# Patient Record
Sex: FEMALE | Race: WHITE | Employment: FULL TIME | URBAN - METROPOLITAN AREA
[De-identification: names, ages, dates, MRNs, and addresses within clinical notes are randomized per-mention and may not be internally consistent; named-entity substitution may affect disease eponyms.]

---

## 2017-09-05 LAB
CHLAMYDIA, EXTERNAL: NEGATIVE
HBSAG, EXTERNAL: NEGATIVE
HIV, EXTERNAL: NEGATIVE
N. GONORRHEA, EXTERNAL: NEGATIVE
RUBELLA, EXTERNAL: NORMAL

## 2018-03-12 LAB — GRBS, EXTERNAL: NEGATIVE

## 2018-03-21 ENCOUNTER — ANESTHESIA (OUTPATIENT)
Dept: LABOR AND DELIVERY | Age: 28
End: 2018-03-21

## 2018-03-21 ENCOUNTER — ANESTHESIA EVENT (OUTPATIENT)
Dept: LABOR AND DELIVERY | Age: 28
End: 2018-03-21

## 2018-03-21 ENCOUNTER — HOSPITAL ENCOUNTER (OUTPATIENT)
Age: 28
LOS: 1 days | Discharge: HOME | End: 2018-03-21
Attending: OBSTETRICS & GYNECOLOGY | Admitting: OBSTETRICS & GYNECOLOGY
Payer: COMMERCIAL

## 2018-03-21 VITALS
HEART RATE: 88 BPM | TEMPERATURE: 98.1 F | SYSTOLIC BLOOD PRESSURE: 112 MMHG | RESPIRATION RATE: 18 BRPM | DIASTOLIC BLOOD PRESSURE: 67 MMHG

## 2018-03-21 PROCEDURE — 99284 EMERGENCY DEPT VISIT MOD MDM: CPT

## 2018-03-21 PROCEDURE — 59025 FETAL NON-STRESS TEST: CPT

## 2018-03-21 NOTE — PROGRESS NOTES
1211-Dr Hernandez at bedside for version attempt. Ultrasound in room. 1215- vertex by ultrasound, no version necessary.

## 2018-03-21 NOTE — PROGRESS NOTES
Antepartum progress note    Patient seen, fetal heart rate and contraction pattern evaluated, patient examined. Here for version. VSS    Bedside US:  Vtx, good movement. Back down. Physical Exam:  Cervical Exam:  3/50/-2 Posterior  Membranes:  Intact  Uterine Activity: None  Fetal Heart Rate: Reactive    Assessment/Plan: Pt here for version since pt breech in the office last week. RTUS done showed [place] presentation. No version needed. Has fu appt in [facility] on Friday.    Patient Active Problem List   Diagnosis Code    Postpartum care following vaginal delivery Z39.2       Chalo Morgan MD

## 2018-03-21 NOTE — IP AVS SNAPSHOT
[address] Patient: Maura Alvarez MRN: [id number] DELORES:0[birth year]38 About your hospitalization You were admitted on:  March 21, 2018 You last received care in the:  [address] [place] [address] You were discharged on:  March 21, 2018 Why you were hospitalized Your primary diagnosis was:  Not on File Follow-up Information None Discharge Orders None A check sue indicates which time of day the medication should be taken. My Medications ASK your doctor about these medications Instructions Each Dose to Equal  
 Morning Noon Evening Bedtime  
 ibuprofen 800 mg tablet Commonly known as:  MOTRIN Your last dose was: Your next dose is: Take 1 Tab by mouth every eight (8) hours as needed. 800 mg PRENATAL PO Your last dose was: Your next dose is: Take  by mouth. Discharge Instructions None Introducing [facility] SERVICES! [employer] introduces [email or web address] patient portal. Now you can access parts of your medical record, email your doctor's office, and request medication refills online. 1. In your internet browser, go to [email or web address] 2. Click on the First Time User? Click Here link in the Sign In box. You will see the New Member Sign Up page. 3. Enter your [email or web address] Access Code exactly as it appears below. You will not need to use this code after youve completed the sign-up process. If you do not sign up before the expiration date, you must request a new code. · [email or web address] Access Code: [email or web address] Expires: 6/19/2018 12:29 PM 
 
4. Enter the last four digits of your Social Security Number (xxxx) and Date of Birth (mm/dd/yyyy) as indicated and click Submit. You will be taken to the next sign-up page. 5. Create a Mir[employer]oint Software ID. This will be your Mir[employer]oint [employer] login ID and cannot be changed, so think of one that is secure and easy to remember. 6. Create a [employer] password. You can change your password at any time. 7. Enter your Password Reset Question and Answer. This can be used at a later time if you forget your password. 8. Enter your e-mail address. You will receive e-mail notification when new information is available in [address] 9. Click Sign Up. You can now view and download portions of your medical record. 10. Click the Download Summary menu link to download a portable copy of your medical information. If you have questions, please visit the Frequently Asked Questions section of the Mir[employer]oint [employer] website. Remember, Mir[employer]oint Software is NOT to be used for urgent needs. For medical emergencies, dial 911. Now available from your iPhone and Android! Providers Seen During Your Hospitalization Provider Specialty Primary office phone Cynthia Morales 7 Gynecology [phone] Your Primary Care Physician (PCP) Primary Care Physician Office Phone Office Fax [address], [address] [phone] You are allergic to the following No active allergies Recent Documentation OB Status Smoking Status Pregnant Former Smoker Emergency Contacts Name Discharge Info Relation Home Work Mobile [facility] DISCHARGE CAREGIVER [3] Mother [14] [phone] [facility] CAREGIVER [3] Spouse [3] [phone] Patient Belongings The following personal items are in your possession at time of discharge: 
                             
 
  
  
Discharge Instructions Attachments/References PREGNANCY: KICK COUNTS (ENGLISH) PREGNANCY: PRECAUTIONS (ENGLISH) Patient Handouts Counting Your Baby's Kicks: Care Instructions Your Care Instructions Counting your baby's kicks is one way your doctor can tell that your baby is healthy. Most women-especially in a first pregnancy-feel their baby move for the first time between 16 and 22 weeks. The movement may feel like flutters rather than kicks. Your baby may move more at certain times of the day. When you are active, you may notice less kicking than when you are resting. At your prenatal visits, your doctor will ask whether the baby is active. In your last trimester, your doctor may ask you to count the number of times you feel your baby move. Follow-up care is a key part of your treatment and safety. Be sure to make and go to all appointments, and call your doctor if you are having problems. It's also a good idea to know your test results and keep a list of the medicines you take. How do you count fetal kicks? · A common method of checking your baby's movement is to count the number of kicks or moves you feel in 1 hour. Ten movements (such as kicks, flutters, or rolls) in 1 hour are normal. Some doctors suggest that you count in the morning until you get to 10 movements. Then you can quit for that day and start again the next day. · Pick your baby's most active time of day to count. This may be any time from morning to evening. · If you do not feel 10 movements in an hour, your baby may be sleeping. Wait for the next hour and count again. When should you call for help? Call your doctor now or seek immediate medical care if: 
? · You noticed that your baby has stopped moving or is moving much less than normal. ? Watch closely for changes in your health, and be sure to contact your doctor if you have any problems. Where can you learn more? Go to http://len-huma.info/. Enter P669 in the search box to learn more about \"Counting Your Baby's Kicks: Care Instructions. \" Current as of: March 16, 2017 Content Version: 11.4 © 1344-9317 [employer]. Care instructions adapted under license by [employer] (which disclaims liability or warranty for this information). If you have questions about a medical condition or this instruction, always ask your healthcare professional. [address] any warranty or liability for your use of this information. Pregnancy Precautions: Care Instructions Your Care Instructions There is no sure way to prevent labor before your due date ( labor) or to prevent most other pregnancy problems. But there are things you can do to increase your chances of a healthy pregnancy. Go to your appointments, follow your doctor's advice, and take good care of yourself. Eat well, and exercise (if your doctor agrees). And make sure to drink plenty of water. Follow-up care is a key part of your treatment and safety. Be sure to make and go to all appointments, and call your doctor if you are having problems. It's also a good idea to know your test results and keep a list of the medicines you take. How can you care for yourself at home? · Make sure you go to your prenatal appointments. At each visit, your doctor will check your blood pressure. Your doctor will also check to see if you have protein in your urine. High blood pressure and protein in urine are signs of preeclampsia. This condition can be dangerous for you and your baby. · Drink plenty of fluids, enough so that your urine is light yellow or clear like water. Dehydration can cause contractions. If you have kidney, heart, or liver disease and have to limit fluids, talk with your doctor before you increase the amount of fluids you drink. · Tell your doctor right away if you notice any symptoms of an infection, such as: ¨ Burning when you urinate. ¨ A foul-smelling discharge from your vagina. ¨ Vaginal itching. ¨ Unexplained fever. ¨ Unusual pain or soreness in your uterus or lower belly. · Eat a balanced diet. Include plenty of foods that are high in calcium and iron. ¨ Foods high in calcium include milk, cheese, yogurt, almonds, and broccoli. ¨ Foods high in iron include red meat, shellfish, poultry, eggs, beans, raisins, whole-grain bread, and leafy green vegetables. · Do not smoke. If you need help quitting, talk to your doctor about stop-smoking programs and medicines. These can increase your chances of quitting for good. · Do not drink alcohol or use illegal drugs. · Follow your doctor's directions about activity. Your doctor will let you know how much, if any, exercise you can do. · Ask your doctor if you can have sex. If you are at risk for early labor, your doctor may ask you to not have sex. · Take care to prevent falls. During pregnancy, your joints are loose, and your balance is off. Sports such as bicycling, skiing, or in-line skating can increase your risk of falling. And don't ride horses or motorcycles, dive, water ski, scuba dive, or parachute jump while you are pregnant. · Avoid getting very hot. Do not use saunas or hot tubs. Avoid staying out in the sun in hot weather for long periods. Take acetaminophen (Tylenol) to lower a high fever. · Do not take any over-the-counter or herbal medicines or supplements without talking to your doctor or pharmacist first. 
When should you call for help? Call 911 anytime you think you may need emergency care. For example, call if: 
? · You passed out (lost consciousness). ? · You have severe vaginal bleeding. ? · You have severe pain in your belly or pelvis. ? · You have had fluid gushing or leaking from your vagina and you know or think the umbilical cord is bulging into your vagina. If this happens, immediately get down on your knees so your rear end (buttocks) is higher than your head. This will decrease the pressure on the cord until help arrives. · ?Call your doctor now or seek immediate medical care if: ? · You have signs of preeclampsia, such as: 
¨ Sudden swelling of your face, hands, or feet. ¨ New vision problems (such as dimness or blurring). ¨ A severe headache. ? · You have any vaginal bleeding. ? · You have belly pain or cramping. ? · You have a fever. ? · You have had regular contractions (with or without pain) for an hour. This means that you have 8 or more within 1 hour or 4 or more in 20 minutes after you change your position and drink fluids. ? · You have a sudden release of fluid from your vagina. ? · You have low back pain or pelvic pressure that does not go away. ? · You notice that your baby has stopped moving or is moving much less than normal. ? Watch closely for changes in your health, and be sure to contact your doctor if you have any problems. Where can you learn more? Go to http://len-huma.info/. Enter [id number] in the search box to learn more about \"Pregnancy Precautions: Care Instructions. \" Current as of: March 16, 2017 Content Version: 11.4 © 1076-8754 [employer]. Care instructions adapted under license by [employer] (which disclaims liability or warranty for this information). If you have questions about a medical condition or this instruction, always ask your healthcare professional. [address] any warranty or liability for your use of this information. Please provide this summary of care documentation to your next provider. Signatures-by signing, you are acknowledging that this After Visit Summary has been reviewed with you and you have received a copy. Patient Signature:  ____________________________________________________________ Date:  ____________________________________________________________  
  
Kimberly Danielson Provider Signature:  ____________________________________________________________ Date:  ____________________________________________________________

## 2018-03-21 NOTE — IP AVS SNAPSHOT
[address] Patient: Sander Dawkins MRN: [id number] GXL:0[birth year] A check sue indicates which time of day the medication should be taken. My Medications ASK your doctor about these medications Instructions Each Dose to Equal  
 Morning Noon Evening Bedtime  
 ibuprofen 800 mg tablet Commonly known as:  MOTRIN Your last dose was: Your next dose is: Take 1 Tab by mouth every eight (8) hours as needed. 800 mg PRENATAL PO Your last dose was: Your next dose is: Take  by mouth.

## 2018-04-04 ENCOUNTER — HOSPITAL ENCOUNTER (INPATIENT)
Age: 28
LOS: 2 days | Discharge: HOME | End: 2018-04-06
Attending: OBSTETRICS & GYNECOLOGY | Admitting: OBSTETRICS & GYNECOLOGY
Payer: COMMERCIAL

## 2018-04-04 PROBLEM — O26.893 RH NEGATIVE STATUS DURING PREGNANCY IN THIRD TRIMESTER: Status: ACTIVE | Noted: 2018-04-04

## 2018-04-04 PROBLEM — Z67.91 RH NEGATIVE STATUS DURING PREGNANCY IN THIRD TRIMESTER: Status: ACTIVE | Noted: 2018-04-04

## 2018-04-04 LAB
BASOPHILS # BLD: 0 K/UL (ref 0–0.06)
BASOPHILS NFR BLD: 0 % (ref 0–2)
DIFFERENTIAL METHOD BLD: ABNORMAL
EOSINOPHIL # BLD: 0.1 K/UL (ref 0–0.4)
EOSINOPHIL NFR BLD: 1 % (ref 0–5)
ERYTHROCYTE [DISTWIDTH] IN BLOOD BY AUTOMATED COUNT: 12.3 % (ref 11.6–14.5)
HCT VFR BLD AUTO: 35.8 % (ref 35–45)
HGB BLD-MCNC: 12.6 G/DL (ref 12–16)
LYMPHOCYTES # BLD: 2.2 K/UL (ref 0.9–3.6)
LYMPHOCYTES NFR BLD: 14 % (ref 21–52)
MCH RBC QN AUTO: 30.4 PG (ref 24–34)
MCHC RBC AUTO-ENTMCNC: 35.2 G/DL (ref 31–37)
MCV RBC AUTO: 86.5 FL (ref 74–97)
MONOCYTES # BLD: 1 K/UL (ref 0.05–1.2)
MONOCYTES NFR BLD: 6 % (ref 3–10)
NEUTS SEG # BLD: 13 K/UL (ref 1.8–8)
NEUTS SEG NFR BLD: 79 % (ref 40–73)
PLATELET # BLD AUTO: 200 K/UL (ref 135–420)
PMV BLD AUTO: 10.5 FL (ref 9.2–11.8)
RBC # BLD AUTO: 4.14 M/UL (ref 4.2–5.3)
WBC # BLD AUTO: 16.3 K/UL (ref 4.6–13.2)

## 2018-04-04 PROCEDURE — 74011250636 HC RX REV CODE- 250/636: Performed by: ADVANCED PRACTICE MIDWIFE

## 2018-04-04 PROCEDURE — 0KQM0ZZ REPAIR PERINEUM MUSCLE, OPEN APPROACH: ICD-10-PCS | Performed by: OBSTETRICS & GYNECOLOGY

## 2018-04-04 PROCEDURE — 74011250637 HC RX REV CODE- 250/637: Performed by: ADVANCED PRACTICE MIDWIFE

## 2018-04-04 PROCEDURE — 4A0HXCZ MEASUREMENT OF PRODUCTS OF CONCEPTION, CARDIAC RATE, EXTERNAL APPROACH: ICD-10-PCS | Performed by: OBSTETRICS & GYNECOLOGY

## 2018-04-04 PROCEDURE — 74011000250 HC RX REV CODE- 250: Performed by: ADVANCED PRACTICE MIDWIFE

## 2018-04-04 PROCEDURE — 65270000029 HC RM PRIVATE

## 2018-04-04 PROCEDURE — 86870 RBC ANTIBODY IDENTIFICATION: CPT | Performed by: ADVANCED PRACTICE MIDWIFE

## 2018-04-04 PROCEDURE — 85025 COMPLETE CBC W/AUTO DIFF WBC: CPT | Performed by: ADVANCED PRACTICE MIDWIFE

## 2018-04-04 PROCEDURE — 86900 BLOOD TYPING SEROLOGIC ABO: CPT | Performed by: ADVANCED PRACTICE MIDWIFE

## 2018-04-04 RX ORDER — OXYTOCIN 10 [USP'U]/ML
10 INJECTION, SOLUTION INTRAMUSCULAR; INTRAVENOUS
Status: COMPLETED | OUTPATIENT
Start: 2018-04-04 | End: 2018-04-04

## 2018-04-04 RX ORDER — SALICYLIC ACID
240 POWDER (GRAM) MISCELLANEOUS ONCE
Status: COMPLETED | OUTPATIENT
Start: 2018-04-04 | End: 2018-04-04

## 2018-04-04 RX ORDER — MISOPROSTOL 200 UG/1
800 TABLET ORAL
Status: COMPLETED | OUTPATIENT
Start: 2018-04-04 | End: 2018-04-05

## 2018-04-04 RX ORDER — SODIUM CHLORIDE 0.9 % (FLUSH) 0.9 %
5-10 SYRINGE (ML) INJECTION EVERY 8 HOURS
Status: DISCONTINUED | OUTPATIENT
Start: 2018-04-04 | End: 2018-04-05

## 2018-04-04 RX ORDER — SODIUM CHLORIDE 0.9 % (FLUSH) 0.9 %
5-10 SYRINGE (ML) INJECTION AS NEEDED
Status: DISCONTINUED | OUTPATIENT
Start: 2018-04-04 | End: 2018-04-05

## 2018-04-04 RX ORDER — LIDOCAINE HYDROCHLORIDE 10 MG/ML
20 INJECTION, SOLUTION EPIDURAL; INFILTRATION; INTRACAUDAL; PERINEURAL AS NEEDED
Status: DISCONTINUED | OUTPATIENT
Start: 2018-04-04 | End: 2018-04-05

## 2018-04-04 RX ADMIN — OXYTOCIN 10 UNITS: 10 INJECTION INTRAVENOUS at 23:42

## 2018-04-04 RX ADMIN — LIDOCAINE HYDROCHLORIDE 20 ML: 10 INJECTION, SOLUTION EPIDURAL; INFILTRATION; INTRACAUDAL; PERINEURAL at 23:38

## 2018-04-04 RX ADMIN — CASTOR OIL 240 ML: 1 LIQUID ORAL at 23:28

## 2018-04-04 NOTE — IP AVS SNAPSHOT
[address] Patient: Kristin Ahuja MRN: [id number] LETHA:[id number] About your hospitalization You were admitted on:  April 4, 2018 You last received care in the:  [place] [address] You were discharged on:  April 6, 2018 Why you were hospitalized Your primary diagnosis was:  Postpartum Care Following Vaginal Delivery Your diagnoses also included:  Labor And Delivery Indication For Care Or Intervention, Rh Negative Status During Pregnancy In Third Trimester, Second Degree Perineal Laceration During Delivery, Delivered Follow-up Information Follow up With Details Comments Contact Info Sandra Razo MD   [address] [facility] 83 [id number] [phone] Albert Mchugh MD In 6 weeks normal postpartum check up. [facility] 200 230 [facility] [id number] [phone] Discharge Orders None A check sue indicates which time of day the medication should be taken. My Medications CHANGE how you take these medications Instructions Each Dose to Equal  
 Morning Noon Evening Bedtime * ibuprofen 800 mg tablet Commonly known as:  MOTRIN What changed:  Another medication with the same name was added. Make sure you understand how and when to take each. Your last dose was: Your next dose is: Take 1 Tab by mouth every eight (8) hours as needed. 800 mg  
    
   
   
   
  
 * ibuprofen 600 mg tablet Commonly known as:  MOTRIN What changed: You were already taking a medication with the same name, and this prescription was added. Make sure you understand how and when to take each. Your last dose was: Your next dose is: Take 1 Tab by mouth every six (6) hours as needed.   
 600 mg  
    
   
   
   
  
 * Notice: This list has 2 medication(s) that are the same as other medications prescribed for you. Read the directions carefully, and ask your doctor or other care provider to review them with you. CONTINUE taking these medications Instructions Each Dose to Equal  
 Morning Noon Evening Bedtime PRENATAL PO Your last dose was: Your next dose is: Take  by mouth. Where to Get Your Medications Information on where to get these meds will be given to you by the nurse or doctor. ! Ask your nurse or doctor about these medications  
  ibuprofen 600 mg tablet Discharge Instructions CONGRATULATIONS ON THE BIRTH OF YOUR BABY! The first six weeks after childbirth is a time of physical and emotional adjustment. This handout will help to answer questions and provide guidance during the postpartum period. Every family's adjustment is unique, so please call if you have further concerns. At anytime we can be reached at [phone]. During office hours please ask to speak to a charge nurse. After hours, the answering service will take a message and the Nurse-Midwife on-call will return your call. If your question can wait until office hours: Monday-Friday 8:30-4:00, please do so. For emergencies or urgent concerns do not hesitate to call us after hours. DIET Your body is in need of a well-balanced, high protein diet to recuperate from birth. Please continue to take your prenatal vitamins for 6 weeks or as long as you are breastfeeding. Continue to drink at least 6-8 cups of water or other liquid a day. A breastfeeding mother also needs extra protein, calories and calcium containing foods. It is a good rule to drink fluids with every feeding in order to maintain an adequate milk supply and avoid dehydration.   Your baby will probably not be bothered by things in your diet, but if the baby seems extremely fussy or develops a rash, you may want to discuss possible food intolerances with your baby's care provider. PAIN MEDICATIONS Acetaminophen (Tylenol), ibuprofen (Motrin), or other prescribed pain medication may be taken as directed to relieve discomfort. The above medications pass in very minimal amounts into the breast milk and usually will not cause problems. There are medications that may affect the baby, so please consult your baby's care provider before taking medication. If you are breastfeeding, be sure to mention this to any care provider you see so that medications that are safe may be selected. There is an excellent resource called [employer] that is a resource for medication safety in pregnancy and lactation. You can visit their website at HungerTime/ or call them toll free at [phone] if you have any questions about medication safety. UTERINE INVOLUTION / VAGINAL BLEEDING Involution is the process of the uterus returning to pre-pregnant size. It will take approximately six weeks for this process to occur. To achieve this size your uterus becomes firm to slow bleeding loss from the placental site. The first 7 days after birth, the bleeding is red and heavy. It may change with your activity and position. Some small clots are normal.   After ten days, the bleeding should be pale pink and slowed considerably. The next several weeks may progress to a pink, mucousy discharge. This may continue for 6-8 weeks, depending on your activity. During the first four weeks after delivery we recommend using sanitary pads instead of tampons. Douching should also be avoided, but it is fine to take a tub bath so long as the tub is very clean. ACTIVITY/EXERCISE Adequate rest is essential to recovery. Try to rest or sleep when the baby sleeps.   After two weeks, you may begin going for short walks, doing Kegel exercises and abdominal crunches. Avoid heavy, jarring or aerobic exercises. Remember to start out slowly and build up to your previous fitness level. Use common sense and don't overdo as rest is important and the benefits of increased rest are a quicker recovery. For the first two weeks after a  try to limit trips up or down steps. Do not lift anything heavier than the baby during this time. Lifting the baby or other objects should be done by bending at the knees rather than the waist.  Driving should be avoided during the first two to three weeks until you have the strength to push firmly on the brakes in case of an emergency. You may ride as a passenger, but DO wear a seat belt at all times. After a few weeks, you may resume normal activity at whatever pace is comfortable for you. Exercise may also be resumed gradually. Walking is a good way to start. Finally, try to be reasonable in your expectations. Caring for a new baby after major surgery can be quite trying. Arrange for assistance at home to ensure that you get enough rest.  
 
POSTPARTUM CHECK You may call the office when you return home to set up a postpartum visit. Most patients will be seen at 6 weeks after delivery, but after a  or other circumstances you may be seen in 2 weeks or less. If you are discharged from the hospital with staples that must be removed, you will be asked to come in sooner. At your postpartum visit, a pelvic exam may be performed. If you are having any problems or concerns, please do not hesitate to call. Once again our number is [phone]. MOOD CHANGES Significant hormonal changes occur in the days following delivery, and as a result, many women experience brief episodes of tearfulness or feeling \"blue. \"  These emotional swings may be made worse by lack of sleep and by the adjustments inherent in becoming a mother.   For some women, these fluctuations are minor. For others, they are overwhelming; creating feelings of anxiety, depression, or the inability to cope. If you have difficulty functioning as a result of feeling down, or if the mood changes seem severe, do not improve, or result is thoughts of harming yourself or others CALL RIGHT AWAY. PERINEAL CARE The basic goals of perineal care are to prevent infection, to relieve pain and promote healing. Your stitches will dissolve in four to six weeks, and do not need to be removed. After urinating, please continue to clean with warm water from front to back. Please continue sitz baths as instructed twice a day for a week or as needed. Call the office if you see pus in the suture site, or have unusual or severe swelling or pain that seems to be getting worse. INCISION CARE If you had a , clean and dry the incision gently as you would the rest of your body. Washing over the area with soap and water, and showering are fine. If steri-strips are present they will gradually come off with time. Tub baths are permitted. You may experience numbness and burning in the area surrounding the incision which usually resolves gradually over the next several weeks or months. RETURN OF MENSTRUATION Your first menstrual period may occur as soon as four to six weeks after your delivery if you are not breast-feeding. If breast-feeding it is more difficult to predict when your first period will occur. Even if you are not yet menstruating, you may be ovulating and it may be possible to conceive again. It is common for your first period after childbirth to be very heavy with an increased amount of cramping. BREASTS Breast-feeding Mothers: Colostrum is excreted in the first 24-72 hours. Mature breast milk will appear on the 2nd to 5th day. Engorgement may occur with the mature milk making your breasts feel warm and very full. Frequent feedings will make you more comfortable. Babies do not nurse on regular schedules. Nursing every 1 1/2 to 2 hours is normal and frequent feeding DOES NOT mean you are not making enough milk. To avoid nipple confusion, do not give bottles for the first 4 weeks. Growth spurts are common and may require more frequent feedings. This is the way baby increases your milk supply. During a growth spurt, you may feel you are feeding very frequently and that your breasts are \"empty. \"  Don't worry, your milk is produced by supply and demand so this increased frequency of feeding will increase your milk supply within 48 hours. Sore nipples may occur with frequent feedings and are sometimes also caused by improper latch. Check for a proper latch. Baby should have a wide open mouth. Use different positions at each feeding if possible. Express a small amount of colostrum or breast milk onto the sore area and leave bra flaps unlatched until dry. The lactation consultant at [facility] is available for outpatient consultation without charge. Call [phone] from Monday-Friday 9:00am- 3:00pm to arrange an outpatient appointment with her. Local [facility] and consultants may also be very helpful. If You Are Not Breast-feeding: You will experience swelling, engorgement and some milk production. There are no safe medications available to stop lactation. Some remedies for engorgement include: wearing a tight bra, ice packs and cold green cabbage leaves placed between the breast and your bra. Change these frequently. Tylenol or Motrin should help with the discomfort. SEXUAL ADJUSTMENTS We recommend that you wait at least four weeks before resuming sexual intercourse. A sore perineum, a demanding baby and fatigue will certainly affect your ability to enjoy lovemaking! A vaginal lubricant is recommended to help with any dryness.   It is very important to remember that you will ovulate BEFORE your first period and can conceive. If you do not wish another pregnancy right away, please take precautions to avoid pregnancy. If you would like a prescription method of birth control, please discuss this with us at your 6 week visit. ELIMINATION We remind all postpartum patients that it may take a few days for your bowels to return to normal, especially if you had a long labor. For those who had C-sections or severe lacerations, we recommend that you use a stool softener twice daily for at least two weeks. Many stool softeners are over-the-counter. Colace (Docusate Sodium) is recommended. Bulk forming agents such as Metamucil or Fibercon may be used daily in addition to a stool softener to promote regular bowel movements. Eating fresh fruits and vegetables along with whole grains is helpful as well. Do not be afraid to have a bowel movement as your stitches will not \"come out\" in the course of having a bowel movement. Urination may be difficult due to soreness around the urethra, or as an after effect of epidural.  This is temporary and can be helped  by squirting water over the perineum or try going in the shower. Hemorrhoids are common after birth. Tucks pads, Anusol cream and avoiding constipation are helpful. If constipation does occur, you may take Milk of Magnesia or Senekot according to the package instructions. DANGER SIGNS! CALL WITHOUT DELAY IF YOU ARE EXPERIENCING ANY OF THE FOLLOWING: 
* Unusually heavy bleeding, soaking more than 1 or more pads in an hour. * Vaginal discharge with strong foul odor. * Fever of 101 or higher * Unusual pain or tenderness in the abdominal area. * If breasts are red, hot or have a painful lump. * Depression that persists longer than 1-2 weeks or is severe. * Any urinary frequency accompanied by urgency or pain. * A lump in leg or calf especially if painful, warm or red. We thank you for choosing us for your prenatal care and/or delivery. We wish you all happiness and health with your baby for his or her lifetime! Introducing \[facility] [place] [facility]\"" & HEALTH SERVICES! [employer] introduces Whatâ€™s On Foodie patient portal. Now you can access parts of your medical record, email your doctor's office, and request medication refills online. 1. In your internet browser, go to [email or web address] 2. Click on the First Time User? Click Here link in the Sign In box. You will see the New Member Sign Up page. 3. Enter your Whatâ[employer] Access Code exactly as it appears below. You will not need to use this code after youve completed the sign-up process. If you do not sign up before the expiration date, you must request a new code. · [employer] Access Code: [email or web address] Expires: 6/19/2018 12:29 PM 
 
4. Enter the last four digits of your Social Security Number (xxxx) and Date of Birth (mm/dd/yyyy) as indicated and click Submit. You will be taken to the next sign-up page. 5. Create a [employer]ie ID. This will be your [employer] login ID and cannot be changed, so think of one that is secure and easy to remember. 6. Create a Whatâ[employer]ie password. You can change your password at any time. 7. Enter your Password Reset Question and Answer. This can be used at a later time if you forget your password. 8. Enter your e-mail address. You will receive e-mail notification when new information is available in [address] 9. Click Sign Up. You can now view and download portions of your medical record. 10. Click the Download Summary menu link to download a portable copy of your medical information. If you have questions, please visit the Frequently Asked Questions section of the Whatâ€™[employer]ie website. Remember, [employer] is NOT to be used for urgent needs. For medical emergencies, dial 911. Now available from your iPhone and Android! Introducing Fei Mathis As a [facility]o [facility]edman patient, I wanted to make you aware of our electronic visit tool called Fei Frenchfin. [facility] 24/7 allows you to connect within minutes with a medical provider 24 hours a day, seven days a week via a mobile device or tablet or logging into a secure website from your computer. You can access Fei Frenchfin from anywhere in the [place]. A virtual visit might be right for you when you have a simple condition and feel like you just dont want to get out of bed, or cant get away from work for an appointment, when your regular Pranayirene Elier provider is not available (evenings, weekends or holidays), or when youre out of town and need minor care. Electronic visits cost only $49 and if the Elo Freedman 24/7 provider determines a prescription is needed to treat your condition, one can be electronically transmitted to a nearby pharmacy*. Please take a moment to enroll today if you have not already done so. The enrollment process is free and takes just a few minutes. To enroll, please download the Elo Freedman 24/7 sincere to your tablet or phone, or visit [email or web address] to enroll on your computer. And, as an [address] [place] [address] patient with a EUDOWEB account, the results of your visits will be scanned into your electronic medical record and your primary care provider will be able to view the scanned results. We urge you to continue to see your regular Elo Freedman provider for your ongoing medical care. And while your primary care provider may not be the one available when you seek a Fei Mathis virtual visit, the peace of mind you get from getting a real diagnosis real time can be priceless. For more information on Fei Coyyolandefin, view our Frequently Asked Questions (FAQs) at [email or web address]. Sincerely, 
 
Greg Leong MD 
Chief Medical Officer Tae Ball *:  certain medications cannot be prescribed via Fei Mathis Providers Seen During Your Hospitalization Provider Specialty Primary office phone Cynthia Mir 7 Gynecology [phone] Your Primary Care Physician (PCP) Primary Care Physician Office Phone Office Fax [address] [place], [address] [place] [phone] You are allergic to the following No active allergies Recent Documentation Height Weight Breastfeeding? BMI OB Status Smoking Status 1.753 m 86.2 kg Unknown 28.06 kg/m2 Recent pregnancy Former Smoker Emergency Contacts Name Discharge Info Relation Home Work Mobile [facility] DISCHARGE CAREGIVER [3] Mother [14] [phone] [facility] CAREGIVER [3] Spouse [3] [phone] Patient Belongings The following personal items are in your possession at time of discharge: 
  Dental Appliances: None  Visual Aid: None      Home Medications: None   Jewelry: Ring  Clothing: At bedside    Other Valuables: At bedside Discharge Instructions Attachments/References BREASTFEEDING (ENGLISH) DEPRESSION: POSTPARTUM (ENGLISH) Patient Handouts Breastfeeding: Care Instructions Your Care Instructions Breastfeeding has many benefits. It may lower your baby's chances of getting an infection. It also may prevent your baby from having problems such as diabetes and high cholesterol later in life. Breastfeeding also helps you bond with your baby. The American Academy of Pediatrics recommends breastfeeding for at least a year. That may be very hard for many women to do, but breastfeeding even for a shorter period of time is a health benefit to you and your baby. In the first days after birth, your breasts make a thick, yellow liquid called colostrum. This liquid gives your baby nutrients and antibodies against infection.  It is all that babies need in the first days after birth. Your breasts will fill with milk a few days after the birth. Breastfeeding is a skill that gets better with practice. It is common to have some problems. Some women have sore or cracked nipples, blocked milk ducts, or a breast infection (mastitis). But if you feed your baby every 1 to 2 hours during the day and follow the tips on this sheet, you may not have these problems. You can treat these problems if they happen and continue breastfeeding. Follow-up care is a key part of your treatment and safety. Be sure to make and go to all appointments, and call your doctor if you are having problems. It's also a good idea to know your test results and keep a list of the medicines you take. How can you care for yourself at home? · Breastfeed your baby whenever he or she is hungry. In the first 2 weeks, your baby will feed about every 1 to 3 hours. This will help you keep up your supply of milk. · Put a bed pillow or a nursing pillow on your lap to support your arms and your baby. · Hold your baby in a comfortable position. ¨ You can hold your baby in several ways. One of the most common positions is the cradle hold. One arm supports your baby, with his or her head in the bend of your elbow. Your open hand supports your baby's bottom or back. Your baby's belly lies against yours. ¨ If you had your baby by , or , try the football hold. This position keeps your baby off your belly. Tuck your baby under your arm, with his or her body along the side you will be feeding on. Support your baby's upper body with your arm. With that hand you can control your baby's head to bring his or her mouth to your breast. 
¨ Try different positions with each feeding. If you are having problems, ask for help from your doctor or a lactation consultant.  
· To get your baby to latch on: 
¨ Support and narrow your breast with one hand using a \"U hold,\" with your thumb on the outer side of your breast and your fingers on the inner side. You can also use a \"C hold,\" with all your fingers below the nipple and your thumb above it. Try the different holds to get the deepest latch for whichever breastfeeding position you use. Your other arm is behind your baby's back, with your hand supporting the base of the baby's head. Position your fingers and thumb to point toward your baby's ears. ¨ You can touch your baby's lower lip with your nipple to get your baby to open his or her mouth. Wait until your baby opens up really wide, like a big yawn. Then be sure to bring the baby quickly to your breast-not your breast to the baby. As you bring your baby toward your breast, use your other hand to support the breast and guide it into his or her mouth. ¨ Both the nipple and a large portion of the darker area around the nipple (areola) should be in the baby's mouth. The baby's lips should be flared outward, not folded in (inverted). ¨ Listen for a regular sucking and swallowing pattern while the baby is feeding. If you cannot see or hear a swallowing pattern, watch the baby's ears, which will wiggle slightly when the baby swallows. If the baby's nose appears to be blocked by your breast, tilt the baby's head back slightly, so just the edge of one nostril is clear for breathing. ¨ When your baby is latched, you can usually remove your hand from supporting your breast and bring it under your baby to cradle him or her. Now just relax and breastfeed your baby. · You will know that your baby is feeding well when: 
¨ His or her mouth covers a lot of the areola, and the lips are flared out. ¨ His or her chin and nose rest against your breast. 
¨ Sucking is deep and rhythmic, with short pauses. ¨ You are able to see and hear your baby swallowing. ¨ You do not feel pain in your nipple.  
· If your baby takes only one breast at a feeding, start the next feeding on the other breast. 
 · Anytime you need to remove your baby from the breast, put one finger in the corner of his or her mouth. Push your finger between your baby's gums to gently break the seal. If you do not break the tight seal before you remove your baby, your nipples can become sore, cracked, or bruised. · After feeding your baby, gently pat his or her back to let out any swallowed air. After your baby burps, offer the breast again, or offer the other breast. Sometimes a baby will want to keep feeding after being burped. When should you call for help? Call your doctor now or seek immediate medical care if: 
? · You have symptoms of a breast infection, such as: 
¨ Increased pain, swelling, redness, or warmth around a breast. 
¨ Red streaks extending from the breast. 
¨ Pus draining from a breast. 
¨ A fever. ? · Your baby has no wet diapers for 6 hours. ? Watch closely for changes in your health, and be sure to contact your doctor if: 
? · Your baby has trouble latching on to your breast.  
? · You continue to have pain or discomfort when breastfeeding. ? · You have other questions or concerns. Where can you learn more? Go to http://len-huma.info/. Enter P492 in the search box to learn more about \"Breastfeeding: Care Instructions. \" Current as of: March 16, 2017 Content Version: 11.4 © 4376-1331 [employer]. Care instructions adapted under license by [employer] (which disclaims liability or warranty for this information). If you have questions about a medical condition or this instruction, always ask your healthcare professional. [place] [address] any warranty or liability for your use of this information. Depression After Childbirth: Care Instructions Your Care Instructions Many women get the \"baby blues\" during the first few days after childbirth. You may lose sleep, feel irritable, and cry easily.  You may feel happy one minute and sad the next. Hormone changes are one cause of these emotional changes. Also, the demands of a new baby, along with visits from relatives or other family needs, add to a mother's stress. The \"baby blues\" often peak around the fourth day. Then they ease up in less than 2 weeks. If your moodiness or anxiety lasts for more than 2 weeks, or if you feel like life is not worth living, you may have postpartum depression. This is different for each mother. Some mothers with serious depression may worry intensely about their infant's well-being. Others may feel distant from their child. Some mothers might even feel that they might harm their baby. A mother may have signs of paranoia, wondering if someone is watching her. Depression is not a sign of weakness. It is a medical condition that requires treatment. Medicine and counseling often work well to reduce depression. Talk to your doctor about taking antidepressant medicine while breastfeeding. Follow-up care is a key part of your treatment and safety. Be sure to make and go to all appointments, and call your doctor if you are having problems. It's also a good idea to know your test results and keep a list of the medicines you take. How do you know if you are depressed? With all the changes in your life, you may not know if you are depressed. Pregnancy sometimes causes changes in how you feel that are similar to the symptoms of depression. Symptoms of depression include: · Feeling sad or hopeless and losing interest in daily activities. These are the most common symptoms of depression. · Sleeping too much or not enough. · Feeling tired. You may feel as if you have no energy. · Eating too much or too little. · Writing or talking about death, such as writing suicide notes or talking about guns, knives, or pills.  Keep the numbers for these national suicide hotlines: 7-024-675-TALK (0-634.104.4751) and 1-522-QFZGQOF (2-603.549.3634). If you or someone you know talks about suicide or feeling hopeless, get help right away. How can you care for yourself at home? · Be safe with medicines. Take your medicines exactly as prescribed. Call your doctor if you think you are having a problem with your medicine. · Eat a healthy diet so that you can keep up your energy. · Get regular daily exercise, such as walks, to help improve your mood. · Get as much sunlight as possible. Keep your shades and curtains open. Get outside as much as you can. · Avoid using alcohol or other substances to feel better. · Get as much rest and sleep as possible. Avoid doing too much. Being too tired can increase depression. · Play stimulating music throughout your day and soothing music at night. · Schedule outings and visits with friends and family. Ask them to call you regularly, so that you do not feel alone. · Ask for help with preparing food and other daily tasks. Family and friends are often happy to help a mother with a . · Be honest with yourself and those who care about you. Tell them about your struggle. · Join a support group of new mothers. No one can better understand the challenges of caring for a  than other new mothers. · If you feel like life is not worth living or are feeling hopeless, get help right away. Keep the numbers for these national suicide hotlines: 5-288-375-TALK (2-020-712-903.281.3708) and 0-801-YSPLYWT (2-658.404.4129). When should you call for help? Call 911 anytime you think you may need emergency care. For example, call if: 
? · You feel you cannot stop from hurting yourself, your baby, or someone else. ?Call your doctor now or seek immediate medical care if: 
? · You are having trouble caring for yourself or your baby. ? · You hear voices. ? Watch closely for changes in your health, and be sure to contact your doctor if: 
? · You have problems with your depression medicine. ? · You do not get better as expected. Where can you learn more? Go to http://len-huma.info/. Enter D077 in the search box to learn more about \"Depression After Childbirth: Care Instructions. \" Current as of: May 12, 2017 Content Version: 11.4 © 0779-6837 [employer]. Care instructions adapted under license by [employer] (which disclaims liability or warranty for this information). If you have questions about a medical condition or this instruction, always ask your healthcare professional. [address] any warranty or liability for your use of this information. Please provide this summary of care documentation to your next provider. Signatures-by signing, you are acknowledging that this After Visit Summary has been reviewed with you and you have received a copy. Patient Signature:  ____________________________________________________________ Date:  ____________________________________________________________  
  
[facility] Provider Signature:  ____________________________________________________________ Date:  ____________________________________________________________

## 2018-04-05 LAB
ABO + RH BLD: NORMAL
BLOOD BANK CMNT PATIENT-IMP: NORMAL
BLOOD GROUP ANTIBODIES SERPL: NORMAL
BLOOD GROUP ANTIBODIES SERPL: NORMAL
SPECIMEN EXP DATE BLD: NORMAL

## 2018-04-05 PROCEDURE — 75410000014 HC MIDWIFREY DEL SVC

## 2018-04-05 PROCEDURE — 75410000002 HC LABOR FEE PER 1 HR

## 2018-04-05 PROCEDURE — 74011250637 HC RX REV CODE- 250/637: Performed by: ADVANCED PRACTICE MIDWIFE

## 2018-04-05 PROCEDURE — 75410000000 HC DELIVERY VAGINAL/SINGLE

## 2018-04-05 PROCEDURE — 75410000003 HC RECOV DEL/VAG/CSECN EA 0.5 HR

## 2018-04-05 PROCEDURE — 65270000029 HC RM PRIVATE

## 2018-04-05 RX ORDER — IBUPROFEN 600 MG/1
600 TABLET ORAL
Status: DISCONTINUED | OUTPATIENT
Start: 2018-04-05 | End: 2018-04-06 | Stop reason: HOSPADM

## 2018-04-05 RX ORDER — MISOPROSTOL 200 UG/1
800 TABLET ORAL
Status: DISCONTINUED | OUTPATIENT
Start: 2018-04-05 | End: 2018-04-06 | Stop reason: HOSPADM

## 2018-04-05 RX ORDER — OXYTOCIN 10 [USP'U]/ML
10 INJECTION, SOLUTION INTRAMUSCULAR; INTRAVENOUS
Status: DISCONTINUED | OUTPATIENT
Start: 2018-04-05 | End: 2018-04-06 | Stop reason: HOSPADM

## 2018-04-05 RX ORDER — HYDROCORTISONE 10 MG/G
CREAM TOPICAL AS NEEDED
Status: DISCONTINUED | OUTPATIENT
Start: 2018-04-05 | End: 2018-04-06 | Stop reason: HOSPADM

## 2018-04-05 RX ORDER — ACETAMINOPHEN 325 MG/1
325-650 TABLET ORAL
Status: DISCONTINUED | OUTPATIENT
Start: 2018-04-05 | End: 2018-04-06 | Stop reason: HOSPADM

## 2018-04-05 RX ORDER — SENNOSIDES 8.6 MG/1
2 TABLET ORAL
Status: DISCONTINUED | OUTPATIENT
Start: 2018-04-05 | End: 2018-04-06 | Stop reason: HOSPADM

## 2018-04-05 RX ADMIN — MISOPROSTOL 800 MCG: 200 TABLET ORAL at 00:11

## 2018-04-05 RX ADMIN — IBUPROFEN 600 MG: 600 TABLET, FILM COATED ORAL at 18:16

## 2018-04-05 RX ADMIN — IBUPROFEN 600 MG: 600 TABLET, FILM COATED ORAL at 03:25

## 2018-04-05 RX ADMIN — IBUPROFEN 600 MG: 600 TABLET, FILM COATED ORAL at 12:54

## 2018-04-05 RX ADMIN — ACETAMINOPHEN 650 MG: 325 TABLET, FILM COATED ORAL at 03:25

## 2018-04-05 RX ADMIN — ACETAMINOPHEN 650 MG: 325 TABLET, FILM COATED ORAL at 19:36

## 2018-04-05 RX ADMIN — ACETAMINOPHEN 650 MG: 325 TABLET, FILM COATED ORAL at 08:16

## 2018-04-05 NOTE — PROGRESS NOTES
Problem: Falls - Risk of  Goal: *Absence of Falls  Document Maritza Fall Risk and appropriate interventions in the flowsheet. Outcome: Progressing Towards Goal  Fall Risk Interventions:        Call light in reach. Hourly Rounding.

## 2018-04-05 NOTE — PROGRESS NOTES
TRANSFER - IN REPORT:    Verbal report received from KRZYSZTOF Bonds RN (name) on [employer]  being received from L&D (unit) for routine progression of care      Report consisted of patients Situation, Background, Assessment and   Recommendations(SBAR). Information from the following report(s) SBAR, Kardex, Intake/Output, MAR and Recent Results was reviewed with the receiving nurse. Opportunity for questions and clarification was provided. Assessment completed upon patients arrival to unit and care assumed.

## 2018-04-05 NOTE — PROGRESS NOTES
Post-Partum Day Number 1 Progress Note    Fran Lay is doing well. Complaining of symphysis pubic pain. Pain controlled with medications. Nursing well. Voiding well. Vitals:  Patient Vitals for the past 8 hrs:   BP Temp Pulse Resp SpO2   18 0325 102/67 97.9 °F (36.6 °C) 88 14 99 %   18 0130 111/73 98 °F (36.7 °C) 91 16 -   18 0100 123/75 - - - -   18 0045 126/81 - - - -   18 0030 127/79 - - - -   18 0015 124/70 - - - -   18 0008 126/79 - - - -     Temp (24hrs), Av °F (36.7 °C), Min:97.9 °F (36.6 °C), Max:98 °F (36.7 °C)      Vital signs stable, afebrile. Exam:  Patient without distress. Breasts intact and nontender               Abdomen soft, fundus firm at level of umbilicus, nontender               Perineum with normal lochia noted. Lower extremities are negative for swelling, cords or tenderness. Lab/Data Review: All lab results for the last 24 hours reviewed. Assessment and Plan:  Holly complaining of symphysis pubis pain. Questioning when she can start to see PT. Will put in a PT consult to evaluate prior to leaving the hospital. Otherwise, Robi Cochran appears to be having uncomplicated post-partum course. Continue routine perineal care and maternal education. Plan discharge tomorrow if no problems occur.     Malachi Alvarenga CNM  2018  7:02 AM

## 2018-04-05 NOTE — H&P
History & Physical    Name: Yenny Galdamez MRN: [id number]  SSN: [id number]    YOB: 1990  Age: 28 y.o. Sex: female        Subjective:     Yenny Galdamez presents with c/o contractions for two-three hours, now more consistently occurring every 3 mins. Was 4-5cm in the office earlier today. Denies LOF, or VB. Feeling good fetal movements. Estimated Date of Delivery: None noted. OB History      Para Term  AB Living    2 1 1   1    SAB TAB Ectopic Molar Multiple Live Births        0 1        OB History:  G1: 2016 Female at 40.1 Epidural     Ms. Llamas is admitted with pregnancy at 40.0weeks for active labor. Prenatal course was complicated by RH neg, received Rhogam prenatally at 35 weeks. . Prenatal care has been [facility] by [facility]. Please see prenatal records for details. 1 hr gtt was 91. Total weight gain for pregnancy was 30 pounds. Admitted to 3422/01. Past Medical History:   Diagnosis Date    History of migraine headaches     Psychiatric problem      Past Surgical History:   Procedure Laterality Date    HX BUNIONECTOMY  2009    bilateral    HX BUNIONECTOMY Bilateral  &     HX TONSILLECTOMY       Social History     Occupational History    Not on file. Social History Main Topics    Smoking status: Former Smoker    Smokeless tobacco: Never Used    Alcohol use Yes      Comment: rarely    Drug use: Yes     Special: Marijuana    Sexual activity: Yes     Partners: Male     No family history on file. No Known Allergies  Prior to Admission medications    Medication Sig Start Date End Date Taking? Authorizing Provider   ibuprofen (MOTRIN) 800 mg tablet Take 1 Tab by mouth every eight (8) hours as needed. 16   Beba Callaway CNM   PNV95/FERROUS FUMARATE/FA (PRENATAL PO) Take  by mouth.     Historical Provider        Review of Systems: A comprehensive review of systems was negative except for that written in the HPI.    Objective:     Vitals: There were no vitals filed for this visit. Physical Exam:  Patient without distress. Abdomen: soft, nontender, gravid  Fundus: soft, non tender and appears appropriate size for gestational age  Perineum: blood absent, amniotic fluid absent  Lower Extremities:  - Edema No  Membranes:  Intact  Fetal Heart Rate: Baseline: 145 per minute  Variability: moderate  Accelerations: yes  Decelerations: none    Contractions every 3 minutes lasting 60 secs. VE 7cm/100/-1   IBOW    Prenatal Labs:   A neg, Immune, Hep B and C, GC/CT, RPR, and HIV negative. Group B Strep was negative. Assessment/Plan:   Assessment: IUP @ 40.0, FHT CAt 1. Appropriate for [facility] care. Plan: Admit for Reassuring fetal status, Labor  Continue expectant management, Continue plan for vaginal delivery. Signed By:  Leila Jackson CNM     April 4, 2018     Addendum: Dr Robert Lowe updated on patients arrival and status.

## 2018-04-05 NOTE — PROGRESS NOTES
Visited mother. Acquired permission to announce baby.     Kan Tang   [facility]   [phone]

## 2018-04-05 NOTE — ROUTINE PROCESS
Bedside and Verbal shift change report given to [address] [place] [address] (oncoming nurse) by Marbella Jenkins RN (offgoing nurse). Report included the following information SBAR, Procedure Summary, Intake/Output, MAR and Recent Results.

## 2018-04-05 NOTE — ROUTINE PROCESS
Bedside and Verbal shift change report given to JERRY Gill rn (oncoming nurse) by SHUBHAM Moser rn (offgoing nurse). Report included the following information SBAR, Kardex, Intake/Output and Recent Results. 1620- Assisted with breast feeding. Encouraged mother to keep trying. Baby got 6 good sucks in and good latch noted. Encouraged to put on call light for further assistance and encouraged skin to skin. 46- Mother doing well, up without complaints, voiding without problems. Pain managed well with motrin. Bonding well with baby.

## 2018-04-05 NOTE — PROGRESS NOTES
~~ 0800 ASSUME CARE OF PT SITTING IN BED, SR UP X2, CB IN REACH, BABY SLEEPING IN CRIB, FOB IN. PT DENIES INCREASED BLEEDING, DIFF VOIDING, NAUSEA OR ITCHING. PT REQ PAIN MED-- WILL GET. WHITE BOARD FILLED OUT, QUIET TIME REVIEWED. PT OK TO SHOWER-- REVIEWED 1st SHOWER NOT TOO LONG OR HOT. REVIEWED EMERGENCY CORD IN BR & WHEN TO USE- PT VOICES UNDERSTANDING.     ~~0816 TYLENOL GIVEN FOR PAIN OF 7/10 FOR CRAMPING, HIP PAIN & SYMPHYSIS PAIN-- REVIEWED PAN SCALE & PT CONFIRMS IT'S NEARLY AS BAD AS LABOR (UNMEDICATED [facility]) WATER MUG REFILLED, REVIEWED WATER CONSUMPTION    ~~ 0900 ASSESSMENT AS CHARTED.     ~~0910 PAIN MED HELPED     ~~ 1015  PT UP IN THE SHOWER- BED PAD CHANGED, BED LINEN FLUFFED. EXTRA BABY BLANKETS PROVIDED & CRIB LINEN CHANGED AFTER SPIT UP-  WATER MUG REFILLED--  MOM OUT OF THE SHOWER SITTING IN CHAIR-- SAYS SHE IS MOVING AROUND BETTER & FEELING BATTER    ~~ 1115 PT RESTING, BONDING W/ BABY    ~~1200 REG DIET LUNCH TRAY SERVED. GRAND PARENTS & TODDLER SIBLING HERE VISITING    ~~1254 MOTRIN GIVEN FOR PAIN OF 7/10 AGAIN--      ~~1330 QUIET TIME- MOTRIN HAS NOT YET TAKEN MUCH EFFECTIVE-- DOWN TO 6-- PT TO CALL DURING QUIET TIME IF ACCEPTABLE LEVEL NOT ACHIEVED.

## 2018-04-05 NOTE — L&D DELIVERY NOTE
Delivery Summary    Patient: Stacy Chapman MRN: [id number]  SSN: [id number]    YOB: 1990  Age: 28 y.o. Sex: female          Baby girl Long Herrera labored very well, having a full conversation in between contractions after initial strip was obtained. Circuit between swaying and squats. Suggested captain Reford Kugel position, which with one contractions there was SROM of clear fluids. FHT hard to obtained, standing. Holly followed instructions well, laid down and FHT noted to be low. Cervical exam revealed c/c/+2. Peds was called to imminent delivery. FHT improved by the time Pediatrician in the the room. With a few guided pushed there was  of a live female infant using NCB methods. Infant delivered OA position, no nuchal although short cord, and clear fluid. Body delivered without difficulty. Lillie to mothers chest, after cord pulse cessation, cord was doubly clamped and cut by Holly. Placenta delivered spontaneously, intact in Sanchez delbert position. Fundus noted to be boggy with moderate bleeding, although firmed up with fundal massage and then minimal bleeding. Placenta inspected and intact (noted to be bi-lobed) with 3 vessel cord central insertion. Perineum and vagina inspected and there was found to be 2nd degree perineal laceration with an skin extension repaired under local anesthesia with 3-0 vicryl suture and resulting in good hemostasis. Some more moderate vaginal bleeding noted while doing repair, IM pitocin given and later at the end of repair cytotec AR. Bleeding then minimal with fundal massage. Infant with Apgars 8/9. EBL 400cc. Pt tolerated procedure well, recovering in LDR. Dr. Sumeet Alejo updated of delivery. Labor Events:    Labor: No    Rupture Date:      Rupture Time:      Rupture Type Intact    Amniotic Fluid Volume:      Amniotic Fluid Description:         Induction: None        Augmentation: None    Labor Complications:        Additional Complications: Cervical Ripening:              Delivery Events:  Episiotomy: None    Laceration(s): Second degree perineal;Extension      Repaired:       Number of Repair Packets:      Suture Type and Size:         Estimated Blood Loss (ml): 400        Information for the patient's :  Griseldasarah Issa [[id number]]     Delivery Summary - Baby    Delivery Date: 2018   Delivery Time: 11:29 PM   Delivery Type:    Sex:  female  Gestational Age: 45.0  Delivery Clinician:  Freida Guerin  Living?: Living   Delivery Location: L&D             APGARS  One minute Five minutes Ten minutes   Skin Color: 0    1       Heart Rate: 2   2         Reflex Irritability: 2   2         Muscle Tone: 2   2       Respiration: 2   2         Total: 8   9           Presentation: Vertex  Position:   Occiput Anterior  Resuscitation Method:  Suctioning-bulb; Tactile Stimulation     Meconium Stained: None    Cord Information: 3 Vessels   Complications: None  Cord Blood Sent?:  Yes    Blood Gases Sent?:  No    Placenta:  Date/Time:  11:39 PM  Removal: Spontaneous      Appearance: Normal;Other (comment)     Liberty Measurements:  Birth Weight: 3695 gm (8#2oz)     Other Providers:   MIGUEL MARTINEZ;LIDA PAYNE;NILSON GARCIA;JANNETTE ORR;;;;;; Midlevel Student;Primary Nurse;Primary Liberty Nurse;Pediatrician;Neonatologist;Anesthesiologist;Crna;Nurse Practitioner;Midwife;Nursery Nurse       Cord Blood Results:  Information for the patient's :  Griselda Danmamie [[id number]]   No results found for: ABORH, PCTABR, PCTDIG, BILI, ABORHEXT, ABORH    Information for the patient's :  Griselda Issa [[id number]]   No results found for: APH, APCO2, APO2, AHCO3, ABEC, ABDC, O2ST, SITE, RSCOM, PHI, PCO2I, PO2I, HCO3I, SO2I, IBD     Information for the patient's newbornRoe Sims [[id number]]   No results found for: EPHV, PCO2V, PO2V, HCO3V, O2STV, EBDV

## 2018-04-05 NOTE — LACTATION NOTE
Mom states baby is latching and nursing well. Experienced mom, no questions. Offered help. Info sheet, daily log and resource list given. Encouraged to call with questions.

## 2018-04-05 NOTE — PROGRESS NOTES
2235 - Patient arrived to unit ambulating accompanied by spouse, tech and PETE Bingham. Patient is a  with complaints of contractions every 3-4 mins for the past hour. VS stable,  FHR monitor applied. Patient denies bleeding, leaking of fluid or decreased fetal movement. [address] Bingham at bedside to assess patient. Patient in standing position with birthing ball for pain control. 2315 - Patient standing swaying with contractions CNM at bedside. Doppler 's.    2320 - CNM out to desk. SROM clear moderate amount of amniotic fluid. Patient standing at bedside. Unable to assess FHR with doppler. External monitor utilized. 2323 - SVE 10/100/+1  's. Patient prepared to start pushing nursery and peds called. 232 -  of viable female infant. Nursery nurse and pediatrician at bedside. Infant placed on towel on patient's abdomen. Towel dried and stimulated, cry heard shortly thereafter. Cord clamped and cut. Infant placed skin to skin with mom. 2339 - Spontaneous delivery of placenta. Placenta intact per PETE Bingham CNM. Patient sustained 2nd degree perineal laceration with an extension. 0000 - Readjusted patient for better visualization of repair. Repaired with 1 3-0 vicryl with local anesthesia. Patient tolerated well. 0015 -  Pericare complete. New bed and peripad placed under patient. Ice pack placed directly on the perineum. Fundus firm at umbilicus. Vital signs documented per protocol. Patient complains of perineal pain. Ice pack applied with lidocaine. 7800 - Vital signs stable. Fundus firm at umbilicus small lochia. Patient denies pain. Patient up to ambulate to bathroom. Patient voided X1. Room stripped and turned over for postpartum care. Linens changed, trash removed. 0203 - Patient transferred to room 3409. Assisted to bed. Side rails X2. Bedside table and call bell within reach.   Reminded to call for assistance prior to void. 0300 - TRANSFER - OUT REPORT:    Verbal report given to ESTHER Noonan RN(name) on [employer]  being transferred to [facility] room 3409(unit) for routine progression of care       Report consisted of patients Situation, Background, Assessment and   Recommendations(SBAR). Information from the following report(s) SBAR, Procedure Summary, Intake/Output, MAR and Recent Results was reviewed with the receiving nurse. Lines:       Opportunity for questions and clarification was provided.

## 2018-04-05 NOTE — PROGRESS NOTES
Mother doing well. Up without complaints. Voiding without problems. Pain managed well with tylenol and ibuprofen. Bonding well with baby.

## 2018-04-06 VITALS
TEMPERATURE: 98 F | RESPIRATION RATE: 16 BRPM | OXYGEN SATURATION: 99 % | BODY MASS INDEX: 28.14 KG/M2 | DIASTOLIC BLOOD PRESSURE: 75 MMHG | HEIGHT: 69 IN | SYSTOLIC BLOOD PRESSURE: 119 MMHG | WEIGHT: 190 LBS | HEART RATE: 95 BPM

## 2018-04-06 LAB
HCT VFR BLD AUTO: 34.2 % (ref 35–45)
HGB BLD-MCNC: 11.7 G/DL (ref 12–16)

## 2018-04-06 PROCEDURE — 74011250637 HC RX REV CODE- 250/637: Performed by: ADVANCED PRACTICE MIDWIFE

## 2018-04-06 PROCEDURE — 85018 HEMOGLOBIN: CPT | Performed by: OBSTETRICS & GYNECOLOGY

## 2018-04-06 PROCEDURE — 36415 COLL VENOUS BLD VENIPUNCTURE: CPT | Performed by: OBSTETRICS & GYNECOLOGY

## 2018-04-06 PROCEDURE — 97161 PT EVAL LOW COMPLEX 20 MIN: CPT

## 2018-04-06 RX ORDER — IBUPROFEN 600 MG/1
600 TABLET ORAL
Qty: 60 TAB | Refills: 2 | Status: SHIPPED | OUTPATIENT
Start: 2018-04-06

## 2018-04-06 RX ADMIN — ACETAMINOPHEN 650 MG: 325 TABLET, FILM COATED ORAL at 01:31

## 2018-04-06 RX ADMIN — ACETAMINOPHEN 650 MG: 325 TABLET, FILM COATED ORAL at 09:23

## 2018-04-06 RX ADMIN — IBUPROFEN 600 MG: 600 TABLET, FILM COATED ORAL at 01:31

## 2018-04-06 RX ADMIN — IBUPROFEN 600 MG: 600 TABLET, FILM COATED ORAL at 09:23

## 2018-04-06 NOTE — DISCHARGE SUMMARY
Obstetrical Discharge Summary       [address]   [address]  [address]  [phone]        Patient Mally Gutierrez [id number],89 Y.V.,    Postpartum Day: Information for the patient's :  Rich Payne [[id number]]   3 days       Admit Date: 2018    Discharge Date: 2018     Admitting Physician: Harry Short MD     Admission Diagnoses: assessment  Labor and delivery indication for care or intervention    Discharge Diagnoses: same as above      Additional Diagnoses:Present on Admission:   (Resolved) Labor and delivery indication for care or intervention   Rh negative status during pregnancy in third trimester   Postpartum care following vaginal delivery       Patient Active Problem List   Diagnosis Code    Postpartum care following vaginal delivery Z39.2    Rh negative status during pregnancy in third trimester O09.893, Z67.91    Second degree perineal laceration during delivery, delivered O70.1         Procedure: Procedure(s):  external cephalic version - anesthesia on stand-by       Baby link  Information for the [facility]'s [facility] Ros[facility] [[id number]]     Delivery Type:     Delivery Date: 2018   Delivery Time: 11:29 PM     Birth Weight: 3.695 kg     Sex:  female  Delivery Clinician:  Alma Delia Plaza   Gestational Age: Gestational Age: 37w0d    Presentation: Vertex   Position:    Occiput  Anterior     Apgars were 8  and 9      Resuscitation Method: Suctioning-bulb; Tactile Stimulation     Meconium Stained: None  Living Status: Living       Placenta Date/Time:  11:39 PM   Placenta Removal: Spontaneous   Placenta Appearance: Vertex [1]     Cord Information: 3 Vessels    Cord Events: None       Cord Blood Sent?:  Yes    Blood Gases Sent?:  No         Baby procedures:    Information for the patient's :  Rich Payne [[id number]]          Feeding Method: Infant Feeding: Breastmilk    Immunizations: Immunization History   Administered Date(s) Administered    DTaP 11/10/2016    Influenza Vaccine 10/18/2016    Rho(D) Immune Globulin - IM 2016        Immunizations:    Immunization History   Administered Date(s) Administered    DTaP 11/10/2016    Influenza Vaccine 10/18/2016    Rho(D) Immune Globulin - IM 2016       Group Beta Strep: GrBStrep, External   Date Value Ref Range Status   2018 Negative   Final          WBC   Date/Time Value Ref Range Status   2018 10:50 PM 16.3 (H) 4.6 - 13.2 K/uL Final   12/15/2016 08:48 PM 13.6 (H) 4.6 - 13.2 K/uL Final     HGB   Date/Time Value Ref Range Status   2018 06:12 AM 11.7 (L) 12.0 - 16.0 g/dL Final   2018 10:50 PM 12.6 12.0 - 16.0 g/dL Final   2016 05:20 AM 10.9 (L) 12.0 - 16.0 g/dL Final     PLATELET   Date/Time Value Ref Range Status   2018 10:50  135 - 420 K/uL Final         Hospital Course: Unremarkable  Subjective:         Robi Cochran is doing and feeling well. Symphysis pain much better and more tolerable. PT to come today to evaluate. Would like to go home today. Yoko 86 well. Mood has been great. Objective:   Breasts Nontender and intact  Fundus firm and involuting  Lochia rubra, minimal  Legs minimal to no edema and without pain.      Lab/Data Review:  Patient Vitals for the past 8 hrs:   BP Temp Pulse Resp SpO2   18 0430 114/80 97.5 °F (36.4 °C) 87 14 98 %       Temp (24hrs), Av.8 °F (36.6 °C), Min:97.5 °F (36.4 °C), Max:98.3 °F (36.8 °C)      WBC   Date/Time Value Ref Range Status   2018 10:50 PM 16.3 (H) 4.6 - 13.2 K/uL Final   12/15/2016 08:48 PM 13.6 (H) 4.6 - 13.2 K/uL Final     HGB   Date/Time Value Ref Range Status   2018 06:12 AM 11.7 (L) 12.0 - 16.0 g/dL Final   2018 10:50 PM 12.6 12.0 - 16.0 g/dL Final   2016 05:20 AM 10.9 (L) 12.0 - 16.0 g/dL Final     PLATELET   Date/Time Value Ref Range Status   2018 10:50  135 - 420 K/uL Final Patient Instructions:   Current Discharge Medication List      START taking these medications    Details   !! ibuprofen (MOTRIN) 600 mg tablet Take 1 Tab by mouth every six (6) hours as needed. Qty: 60 Tab, Refills: 2       !! - Potential duplicate medications found. Please discuss with provider. CONTINUE these medications which have NOT CHANGED    Details   PNV95/FERROUS FUMARATE/FA (PRENATAL PO) Take  by mouth. !! ibuprofen (MOTRIN) 800 mg tablet Take 1 Tab by mouth every eight (8) hours as needed. Qty: 60 Tab, Refills: 0       !! - Potential duplicate medications found. Please discuss with provider. Assessment:     Status post: postpartum vaginal delivery   SPD- pain improving   Recovering well  Breastfeeding  Mood Stable      Plan:     Postpartum care discussed including diet, ambulation,actvitiy restrictions, and mood fluctuations. Discharge instructions and questions answered for vaginal delivery.   Follow in 6 wks or prn      Signed By: Nely Mittal CNM     April 6, 2018

## 2018-04-06 NOTE — PROGRESS NOTES
Problem: Mobility Impaired (Adult and Pediatric)  Goal: *Acute Goals and Plan of Care (Insert Text)  Outcome: Resolved/Met Date Met: 04/06/18  physical Therapy EVALUATION and Discharge    Patient: Mary Lugo (20 y.o. female)  Date: 4/6/2018  Primary Diagnosis: assessment  Labor and delivery indication for care or intervention  Procedure(s) (LRB):  external cephalic version - anesthesia on stand-by (N/A)     Precautions:   Fall    ASSESSMENT AND RECOMMENDATIONS:  Based on the objective data described below, the patient presents with good mobility and function with safe bed mobility, transfers and ambulation. Patient educated on the importance of ambulation and maintaining current function, using ice for pain control limit carrying of baby and older child as much as possible, start exercises slowly and allow for healing return to outpatient PT when ok by doctor. verbalized understanding . As a result skilled physical therapy is not indicated at this time.   Discharge Recommendations: None   Further Equipment Recommendations for Discharge: N/A      SUBJECTIVE:   Patient stated .    OBJECTIVE DATA SUMMARY:     Past Medical History:   Diagnosis Date    History of migraine headaches     Psychiatric problem 2013     Past Surgical History:   Procedure Laterality Date    HX BUNIONECTOMY  2009    bilateral    HX BUNIONECTOMY Bilateral 2010 & 2011    HX TONSILLECTOMY       Barriers to Learning/Limitations: None  Compensate with: visual, verbal, tactile, kinesthetic cues/model  GCODES(GP):n/a  Eval Complexity: History: HIGH Complexity :3+ comorbidities / personal factors will impact the outcome/ POC Exam:MEDIUM Complexity : 3 Standardized tests and measures addressing body structure, function, activity limitation and / or participation in recreation  Presentation: LOW Complexity : Stable, uncomplicated  Clinical Decision Making:Low Complexity clinical observation mmt, rom balance and gait Overall Complexity:LOW   Prior Level of Function/Home Situation: independent   Home Situation  Home Environment: Private residence  # Steps to Enter: 3  One/Two Story Residence: Two story, live on 1st floor  Living Alone: No  Support Systems: Spouse/Significant Other/Partner  Patient Expects to be Discharged to[de-identified] Private residence  Current DME Used/Available at Home: None  Tub or Shower Type: Tub/Shower combination  Critical Behavior:  Neurologic State: Alert  Orientation Level: Oriented X4  Cognition: Follows commands; Appropriate decision making; Appropriate for age attention/concentration; Appropriate safety awareness  Safety/Judgement: Awareness of environment; Fall prevention  Psychosocial  Patient Behaviors: Calm; Cooperative  Family  Behaviors: Calm; Cooperative  Needs Expressed: Educational  Purposeful Interaction: Yes  Pt Identified Daily Priority: Clinical issues (comment) (pain mangement. )  Caritas Process: Nurture loving kindness; Teaching/learning  Caring Interventions: Reassure  Reassure: Therapeutic listening; Informing  Strength:    Strength: Generally decreased, functional (left<right in legs wfl in both UE's )  Tone & Sensation:   Tone: Normal  Sensation: Intact ( patietn reports was decreased yesterday )  Range Of Motion:  AROM: Within functional limits  PROM: Within functional limits  Functional Mobility:  Bed Mobility:  Rolling: Modified independent  Supine to Sit: Modified independent  Transfers:  Sit to Stand: Modified independent  Stand to Sit: Modified independent  Balance:   Sitting: Intact  Standing: Impaired  Standing - Static: Good;Fair  Standing - Dynamic : Fair  Ambulation/Gait Training:  Gait Description (WDL): Within defined limits  Therapeutic Exercises: Ankle; pumps glut sets isometric hip add  Pain:  Pain Scale 1: Numeric (0 - 10)  Activity Tolerance:   good  Please refer to the flowsheet for vital signs taken during this treatment.   After treatment:   [x]         Patient left in no apparent distress sitting up in chair  []         Patient left in no apparent distress in bed  [x]         Call bell left within reach  [x]         Nursing notified  []         Caregiver present  []         Bed alarm activated    COMMUNICATION/EDUCATION: verbalized and demonstrated understanding. [x]         Fall prevention education was provided and the patient/caregiver indicated understanding. [x]         Patient/family have participated as able in goal setting and plan of care. [x]         Patient/family agree to work toward stated goals and plan of care. []         Patient understands intent and goals of therapy, but is neutral about his/her participation. []         Patient is unable to participate in goal setting and plan of care.     Thank you for this referral.  Traci Estes, PT   Time Calculation: 15 mins

## 2018-04-06 NOTE — ROUTINE PROCESS
Bedside shift change report given to ESTHER Noonan RN  (oncoming nurse) by [facility] Shabnam RN  (offgoing nurse). Report included the following information SBAR, Kardex, Intake/Output, MAR and Recent Results.

## 2018-04-06 NOTE — ROUTINE PROCESS
Bedside and Verbal shift change report given to JERRY Gill rn (oncoming nurse) by [facility] rn (offgoing nurse). Report included the following information SBAR, Kardex, Intake/Output and Recent Results. 1140- Discharge instructions reviewed with patient. Understanding verbalized of all instructions given. Time given for questions, all questions answered. Electronic signature obtained. Patient was seen by PT before discharge. 1159- taken to car in wheel chair in stable condition holding baby in car seat.

## 2018-04-06 NOTE — PROGRESS NOTES
Mother doing well. Up without complaints. Voiding without problems. Pain managed well with ibuprofen and tylenol. Bonding well with baby.

## 2018-04-06 NOTE — DISCHARGE INSTRUCTIONS

## 2019-07-29 ENCOUNTER — HOSPITAL ENCOUNTER (OUTPATIENT)
Dept: LAB | Age: 29
Discharge: HOME | End: 2019-07-29
Payer: COMMERCIAL

## 2019-07-29 PROCEDURE — 87624 HPV HI-RISK TYP POOLED RSLT: CPT

## 2019-07-29 PROCEDURE — 88175 CYTOPATH C/V AUTO FLUID REDO: CPT

## 2024-06-05 NOTE — PROGRESS NOTES
Caller: patient    Doctor: PAULIE    Reason for call: calling back    Call back#:      Reactive nst    Prenatal precautions andkick counts inst  Time for questions given  Pt vu  3223  dcd to home with